# Patient Record
Sex: FEMALE | Race: WHITE | Employment: FULL TIME | URBAN - METROPOLITAN AREA
[De-identification: names, ages, dates, MRNs, and addresses within clinical notes are randomized per-mention and may not be internally consistent; named-entity substitution may affect disease eponyms.]

---

## 2020-05-24 ENCOUNTER — APPOINTMENT (OUTPATIENT)
Dept: GENERAL RADIOLOGY | Age: 25
End: 2020-05-24
Payer: COMMERCIAL

## 2020-05-24 ENCOUNTER — HOSPITAL ENCOUNTER (EMERGENCY)
Age: 25
Discharge: HOME | End: 2020-05-24
Payer: COMMERCIAL

## 2020-05-24 VITALS
OXYGEN SATURATION: 94 % | SYSTOLIC BLOOD PRESSURE: 161 MMHG | HEIGHT: 64 IN | TEMPERATURE: 97.6 F | RESPIRATION RATE: 18 BRPM | DIASTOLIC BLOOD PRESSURE: 89 MMHG | WEIGHT: 293 LBS | BODY MASS INDEX: 50.02 KG/M2 | HEART RATE: 95 BPM

## 2020-05-24 PROCEDURE — 73110 X-RAY EXAM OF WRIST: CPT

## 2020-05-24 PROCEDURE — 99283 EMERGENCY DEPT VISIT LOW MDM: CPT

## 2020-05-24 PROCEDURE — 6360000002 HC RX W HCPCS: Performed by: NURSE PRACTITIONER

## 2020-05-24 PROCEDURE — 6370000000 HC RX 637 (ALT 250 FOR IP): Performed by: NURSE PRACTITIONER

## 2020-05-24 RX ORDER — NAPROXEN 250 MG/1
500 TABLET ORAL ONCE
Status: COMPLETED | OUTPATIENT
Start: 2020-05-24 | End: 2020-05-24

## 2020-05-24 RX ORDER — METHYLPREDNISOLONE 4 MG/1
TABLET ORAL
Qty: 1 KIT | Refills: 0 | Status: SHIPPED | OUTPATIENT
Start: 2020-05-24 | End: 2020-05-30

## 2020-05-24 RX ORDER — NAPROXEN 500 MG/1
500 TABLET ORAL 2 TIMES DAILY
Qty: 20 TABLET | Refills: 0 | Status: SHIPPED | OUTPATIENT
Start: 2020-05-24 | End: 2021-09-29

## 2020-05-24 RX ORDER — METHYLPREDNISOLONE 4 MG/1
8 TABLET ORAL ONCE
Status: COMPLETED | OUTPATIENT
Start: 2020-05-24 | End: 2020-05-24

## 2020-05-24 RX ADMIN — NAPROXEN 500 MG: 250 TABLET ORAL at 13:22

## 2020-05-24 RX ADMIN — METHYLPREDNISOLONE 8 MG: 4 TABLET ORAL at 13:22

## 2020-05-24 ASSESSMENT — PAIN DESCRIPTION - ORIENTATION: ORIENTATION: RIGHT;LEFT

## 2020-05-24 ASSESSMENT — PAIN DESCRIPTION - FREQUENCY: FREQUENCY: CONTINUOUS

## 2020-05-24 ASSESSMENT — PAIN DESCRIPTION - PAIN TYPE: TYPE: ACUTE PAIN

## 2020-05-24 ASSESSMENT — PAIN SCALES - GENERAL: PAINLEVEL_OUTOF10: 8

## 2020-05-24 ASSESSMENT — PAIN - FUNCTIONAL ASSESSMENT: PAIN_FUNCTIONAL_ASSESSMENT: PREVENTS OR INTERFERES WITH MANY ACTIVE NOT PASSIVE ACTIVITIES

## 2020-05-24 ASSESSMENT — PAIN DESCRIPTION - PROGRESSION: CLINICAL_PROGRESSION: GRADUALLY WORSENING

## 2020-05-24 ASSESSMENT — PAIN DESCRIPTION - DESCRIPTORS: DESCRIPTORS: ACHING;TINGLING

## 2020-05-24 ASSESSMENT — PAIN DESCRIPTION - LOCATION: LOCATION: HAND

## 2020-05-24 NOTE — ED PROVIDER NOTES
[facility]  eMERGENCY dEPARTMENT eNCOUnter    Pt Name: @@  MRN: [id number]  [id number]  Date of evaluation: 5/24/2020  Provider: BERT Gonzales CNP     Evaluated by the advanced practice provider    CHIEF COMPLAINT       Chief Complaint   Patient presents with    Other     patient c/o bilateral hand swelling x 1 week. states now having \"trouble holding on to things\". to ED for eval         HISTORY OF PRESENT ILLNESS  (Location/Symptom, Timing/Onset, Context/Setting, Quality, Duration, Modifying Factors, Severity.)   Katty Portillo is a 22 y.o. female who presents to the emergencydepartment complaining of an acute onset of bilateral hand swelling and wrist pain, hand pain. No known injury. Started about a week ago. Patient states that it is hard for her to hold and grasp things bilaterally due to the pain and the swelling. She works in [employer] and she also works [employer] She has been busy with both jobs lately. She denies any other joint involvement or swelling. She denies any trauma. She denies any fever or chills. Denies any known health history or medical conditions. Right-hand-dominant. Patient reports the right hand is worse than the left. Nursing Notes were reviewed and I agree. REVIEW OF SYSTEMS    (2-9 systems for level 4, 10 or more for level 5)     Review of Systems   Musculoskeletal: Positive for arthralgias and joint swelling. All other systems reviewed and are negative. Except as noted above the remainder of the review of systems was reviewed and negative. PAST MEDICAL HISTORY   History reviewed. No pertinent past medical history. SURGICAL HISTORY           Procedure Laterality Date    CHOLECYSTECTOMY      TONSILLECTOMY         CURRENT MEDICATIONS       Discharge Medication List as of 5/24/2020  1:31 PM          ALLERGIES     Rios Collazo [aspirin]    FAMILY HISTORY     History reviewed.  No pertinent family history. No family status information on file. SOCIAL HISTORY      reports that she has been smoking cigarettes. She does not have any smokeless tobacco history on file. She reports previous alcohol use. She reports previous drug use. PHYSICAL EXAM    (up to 7 for level 4, 8 or more for level 5)      ED Triage Vitals [05/24/20 1218]   BP Temp Temp Source Pulse Resp SpO2 Height Weight   (!) 161/89 97.6 °F (36.4 °C) Oral 95 18 94 % 5' 4\" (1.626 m) (!) 467 lb 2.5 oz (211.9 kg)       Physical Exam  Vitals signs and nursing note reviewed. Constitutional:       General: She is not in acute distress. Appearance: She is not ill-appearing, toxic-appearing or diaphoretic. Cardiovascular:      Rate and Rhythm: Normal rate and regular rhythm. Pulses: Normal pulses. Radial pulses are 2+ on the right side and 2+ on the left side. Comments: Negative Bala sign bilaterally. Cap refill bilateral hands and digits less than 2 seconds. Pulmonary:      Effort: Pulmonary effort is normal.   Musculoskeletal:      Right wrist: She exhibits decreased range of motion, tenderness and effusion. She exhibits no crepitus, no deformity and no laceration. Left wrist: She exhibits decreased range of motion, tenderness and swelling. She exhibits no bony tenderness, no effusion, no crepitus, no deformity and no laceration. Right hand: She exhibits decreased range of motion and swelling. She exhibits no bony tenderness, normal two-point discrimination, normal capillary refill, no deformity and no laceration. Normal sensation noted. Decreased strength noted. She exhibits wrist extension trouble. Left hand: She exhibits decreased range of motion and swelling. She exhibits no tenderness, no bony tenderness, normal two-point discrimination, normal capillary refill, no deformity and no laceration. Normal sensation noted. Decreased strength noted. She exhibits wrist extension trouble.  She

## 2020-05-27 ENCOUNTER — TELEPHONE (OUTPATIENT)
Dept: ORTHOPEDIC SURGERY | Age: 25
End: 2020-05-27

## 2021-09-28 ENCOUNTER — APPOINTMENT (OUTPATIENT)
Dept: GENERAL RADIOLOGY | Age: 26
End: 2021-09-28

## 2021-09-28 ENCOUNTER — HOSPITAL ENCOUNTER (EMERGENCY)
Age: 26
Discharge: HOME | End: 2021-09-29
Attending: EMERGENCY MEDICINE

## 2021-09-28 VITALS
SYSTOLIC BLOOD PRESSURE: 136 MMHG | OXYGEN SATURATION: 95 % | HEART RATE: 87 BPM | RESPIRATION RATE: 16 BRPM | DIASTOLIC BLOOD PRESSURE: 88 MMHG | TEMPERATURE: 98 F

## 2021-09-28 DIAGNOSIS — L02.619 CELLULITIS AND ABSCESS OF FOOT: Primary | ICD-10-CM

## 2021-09-28 DIAGNOSIS — L03.119 CELLULITIS AND ABSCESS OF FOOT: Primary | ICD-10-CM

## 2021-09-28 PROCEDURE — 99284 EMERGENCY DEPT VISIT MOD MDM: CPT

## 2021-09-28 PROCEDURE — 73630 X-RAY EXAM OF FOOT: CPT

## 2021-09-28 PROCEDURE — 2500000003 HC RX 250 WO HCPCS: Performed by: NURSE PRACTITIONER

## 2021-09-28 PROCEDURE — 6370000000 HC RX 637 (ALT 250 FOR IP): Performed by: NURSE PRACTITIONER

## 2021-09-28 RX ORDER — LIDOCAINE HYDROCHLORIDE AND EPINEPHRINE 10; 10 MG/ML; UG/ML
20 INJECTION, SOLUTION INFILTRATION; PERINEURAL ONCE
Status: COMPLETED | OUTPATIENT
Start: 2021-09-28 | End: 2021-09-28

## 2021-09-28 RX ORDER — HYDROCODONE BITARTRATE AND ACETAMINOPHEN 5; 325 MG/1; MG/1
1 TABLET ORAL ONCE
Status: COMPLETED | OUTPATIENT
Start: 2021-09-28 | End: 2021-09-28

## 2021-09-28 RX ORDER — CEPHALEXIN 500 MG/1
1000 CAPSULE ORAL ONCE
Status: COMPLETED | OUTPATIENT
Start: 2021-09-28 | End: 2021-09-28

## 2021-09-28 RX ORDER — SULFAMETHOXAZOLE AND TRIMETHOPRIM 800; 160 MG/1; MG/1
1 TABLET ORAL ONCE
Status: COMPLETED | OUTPATIENT
Start: 2021-09-28 | End: 2021-09-28

## 2021-09-28 RX ADMIN — HYDROCODONE BITARTRATE AND ACETAMINOPHEN 1 TABLET: 5; 325 TABLET ORAL at 23:11

## 2021-09-28 RX ADMIN — SULFAMETHOXAZOLE AND TRIMETHOPRIM 1 TABLET: 800; 160 TABLET ORAL at 23:48

## 2021-09-28 RX ADMIN — LIDOCAINE HYDROCHLORIDE AND EPINEPHRINE 20 ML: 10; 10 INJECTION, SOLUTION INFILTRATION; PERINEURAL at 23:48

## 2021-09-28 RX ADMIN — CEPHALEXIN 1000 MG: 500 CAPSULE ORAL at 23:48

## 2021-09-28 ASSESSMENT — PAIN SCALES - GENERAL
PAINLEVEL_OUTOF10: 0
PAINLEVEL_OUTOF10: 10

## 2021-09-29 RX ORDER — CEPHALEXIN 500 MG/1
500 CAPSULE ORAL 4 TIMES DAILY
Qty: 28 CAPSULE | Refills: 0 | Status: SHIPPED | OUTPATIENT
Start: 2021-09-29 | End: 2021-10-06

## 2021-09-29 RX ORDER — NAPROXEN 500 MG/1
500 TABLET ORAL 2 TIMES DAILY PRN
Qty: 40 TABLET | Refills: 0 | Status: SHIPPED | OUTPATIENT
Start: 2021-09-29 | End: 2022-04-24 | Stop reason: ALTCHOICE

## 2021-09-29 RX ORDER — NAPROXEN 500 MG/1
500 TABLET ORAL 2 TIMES DAILY WITH MEALS
Qty: 40 TABLET | Refills: 0 | Status: SHIPPED | OUTPATIENT
Start: 2021-09-29 | End: 2021-09-29 | Stop reason: SDUPTHER

## 2021-09-29 RX ORDER — SULFAMETHOXAZOLE AND TRIMETHOPRIM 800; 160 MG/1; MG/1
2 TABLET ORAL 2 TIMES DAILY
Qty: 28 TABLET | Refills: 0 | Status: SHIPPED | OUTPATIENT
Start: 2021-09-29 | End: 2021-10-06

## 2021-09-29 NOTE — ED TRIAGE NOTES
Patient  Presents to the ED with complaints of right heel pain. States she works two jobs, and she can not work because she is in so much pain.  She states something feels like it is \"popping out\" Denies any trauma

## 2021-09-29 NOTE — ED PROVIDER NOTES
[facility] [place] ENCOUNTER          NURSE PRACTITIONER NOTE       Date of evaluation: 9/28/2021    Chief Complaint     Foot Pain      History of Present Illness     Isa George is a 26 y.o. female who presents to the emergency department with a complaint of right foot pain. Patient states her right foot has been hurting for the past couple weeks, however the pain has been progressively worsening. She states that she is noted a \"knot\" on the side of her foot which seems to be getting larger. She does work on her feet 16+ hours a day at her 2 different  jobs; and states that she just thought that she stepped on her foot wrong. She denies any known trauma or injury to her foot. Denies associated fevers chills or sweats, does state that she feels like her foot is a little bit more red than normal.  Pain is worsened with weightbearing and touching of the area. Review of Systems     Review of Systems   Constitutional: Negative. Musculoskeletal:        Foot pain   Skin:        Foot redness   Allergic/Immunologic: Negative for immunocompromised state. Neurological: Negative for numbness. Hematological: Does not bruise/bleed easily. Psychiatric/Behavioral: Negative. Past Medical, Surgical, Family, and Social History     She has no past medical history on file. She has a past surgical history that includes Tonsillectomy and Cholecystectomy. Her family history is not on file. She reports that she has been smoking cigarettes. She has never used smokeless tobacco. She reports previous alcohol use. She reports previous drug use. Medications     Previous Medications    No medications on file       Allergies     She is allergic to asa [aspirin]. Physical Exam     INITIAL VITALS: BP: 136/88, Temp: 98 °F (36.7 °C), Pulse: 87, Resp: 16, SpO2: 95 %   Physical Exam  Vitals and nursing note reviewed. Constitutional:       General: She is not in acute distress. Appearance: Normal appearance. She is obese. She is not ill-appearing. Cardiovascular:      Rate and Rhythm: Normal rate. Pulmonary:      Effort: Pulmonary effort is normal. No respiratory distress. Musculoskeletal:         General: Swelling and tenderness present. Comments: Edema and tenderness to the plantar aspect of the right foot with a 1-2cm area of fluctuance to the medial heel   Skin:     General: Skin is warm and dry. Neurological:      Mental Status: She is alert and oriented to person, place, and time. Psychiatric:         Mood and Affect: Mood normal.         Behavior: Behavior normal.         Diagnostic Results       RADIOLOGY:  XR FOOT RIGHT (MIN 3 VIEWS)   Final Result   Impression:   No acute osseous injury. Diffuse soft tissue swelling. LABS:   No results found for this visit on 09/28/21. RECENT VITALS:  BP: 136/88, Temp: 98 °F (36.7 °C), Pulse: 87, Resp: 16, SpO2: 95 %     Procedures     Patient's right foot/heel abscess was prepped with betadine; 1% lidocaine with epinephrine was used to anesthetize area, 2mls used; #10 scalpel was used to make a 1cm laceration over the area of most fluctuance; very small amount of cloudy drainage expressed. Patient tolerated procedure well. ED Course     Nursing Notes, Past Medical Hx, Past Surgical Hx, Social Hx, Allergies, and Family Hx were reviewed.     The patient was given the following medications:  Orders Placed This Encounter   Medications    HYDROcodone-acetaminophen (NORCO) 5-325 MG per tablet 1 tablet    lidocaine-EPINEPHrine 1 %-1:099094 injection 20 mL    sulfamethoxazole-trimethoprim (BACTRIM DS;SEPTRA DS) 800-160 MG per tablet 1 tablet     Order Specific Question:   Antimicrobial Indications     Answer:   Skin and Soft Tissue Infection    cephALEXin (KEFLEX) capsule 1,000 mg     Order Specific Question:   Antimicrobial Indications     Answer:   Skin and Soft Tissue Infection    DISCONTD: naproxen (NAPROSYN) 500 MG tablet     Sig: Take 1 tablet by mouth 2 times daily (with meals)     Dispense:  40 tablet     Refill:  0    cephALEXin (KEFLEX) 500 MG capsule     Sig: Take 1 capsule by mouth 4 times daily for 7 days     Dispense:  28 capsule     Refill:  0    sulfamethoxazole-trimethoprim (BACTRIM DS) 800-160 MG per tablet     Sig: Take 2 tablets by mouth 2 times daily for 7 days     Dispense:  28 tablet     Refill:  0    naproxen (NAPROSYN) 500 MG tablet     Sig: Take 1 tablet by mouth 2 times daily as needed for Pain     Dispense:  40 tablet     Refill:  0            CONSULTS:  None    MEDICAL DECISION MAKING / ASSESSMENT / Halifax Aleksandra is a 26 y.o. female who presents with complaints as noted in HPI. Patient presents with right foot pain. On evaluation has erythema and tenderness throughout plantar aspect of her right foot, with an area of fluctuance to the medial right heel; on bedside ultrasound small amount of fluid noted over this area. Given erythema, edema and concerns for infection patient was started on Bactrim/Keflex for treatment, and an incision and drainage of the area of fluctuance was done as noted above. Only a small amount of drainage was expressed, patient was given wound care instructions as well as instructions on warm soapy soaks. She will be sent home with a prescription for 7-day course of Bactrim/Keflex, as well as Naprosyn as needed for pain. Provided with a work note for the next several days, as well as podiatry follow-up. Patient was given strict return precautions as outlined in the AVS. Patient was agreeable and understanding to this plan of care. This patient was also evaluated by the attending physician. All care plans were discussed and agreed upon. Clinical Impression     1.  Cellulitis and abscess of foot        Disposition     PATIENT REFERRED TO:  Riley Arndt DPM  [id number] [address]  [address]  [address] [facility] [phone]      call for follow up appointment this week    [facility] Emergency Department  [address]  [phone]    If symptoms worsen      DISCHARGE MEDICATIONS:  New Prescriptions    CEPHALEXIN (KEFLEX) 500 MG CAPSULE    Take 1 capsule by mouth 4 times daily for 7 days    NAPROXEN (NAPROSYN) 500 MG TABLET    Take 1 tablet by mouth 2 times daily as needed for Pain    SULFAMETHOXAZOLE-TRIMETHOPRIM (BACTRIM DS) 800-160 MG PER TABLET    Take 2 tablets by mouth 2 times daily for 7 days       DISPOSITION Decision To Discharge 09/29/2021 12:28:34 AM        BERT Magaña - BIRGIT  09/29/21 0038

## 2021-09-29 NOTE — ED PROVIDER NOTES
ED Attending Attestation Note     Date of evaluation: 9/28/2021    This patient was seen by the advance practice provider. I have seen and examined the patient, agree with the workup, evaluation, management and diagnosis. The care plan has been discussed. My assessment reveals adult female with fluctuant area on the medial aspect of her right heel. She says been present for couple weeks, feels like is getting somewhat worse. On examination there is a small area of fluctuance, and on ultrasound does demonstrate a somewhat complex area of some fluid and some hyperechoic material, may be phlegmon. Unable to print ultrasound images.   Bri Mclaughlin MD  09/28/21 [id number]

## 2022-01-07 ENCOUNTER — APPOINTMENT (OUTPATIENT)
Dept: GENERAL RADIOLOGY | Age: 27
End: 2022-01-07
Payer: COMMERCIAL

## 2022-01-07 ENCOUNTER — HOSPITAL ENCOUNTER (EMERGENCY)
Age: 27
Discharge: HOME | End: 2022-01-07
Attending: STUDENT IN AN ORGANIZED HEALTH CARE EDUCATION/TRAINING PROGRAM
Payer: COMMERCIAL

## 2022-01-07 VITALS
HEIGHT: 64 IN | BODY MASS INDEX: 50.02 KG/M2 | SYSTOLIC BLOOD PRESSURE: 155 MMHG | HEART RATE: 91 BPM | RESPIRATION RATE: 18 BRPM | TEMPERATURE: 97.6 F | WEIGHT: 293 LBS | DIASTOLIC BLOOD PRESSURE: 89 MMHG | OXYGEN SATURATION: 98 %

## 2022-01-07 DIAGNOSIS — L02.91 ABSCESS: Primary | ICD-10-CM

## 2022-01-07 PROCEDURE — 10060 I&D ABSCESS SIMPLE/SINGLE: CPT

## 2022-01-07 PROCEDURE — 96372 THER/PROPH/DIAG INJ SC/IM: CPT

## 2022-01-07 PROCEDURE — 6370000000 HC RX 637 (ALT 250 FOR IP): Performed by: STUDENT IN AN ORGANIZED HEALTH CARE EDUCATION/TRAINING PROGRAM

## 2022-01-07 PROCEDURE — 99283 EMERGENCY DEPT VISIT LOW MDM: CPT

## 2022-01-07 PROCEDURE — 73630 X-RAY EXAM OF FOOT: CPT

## 2022-01-07 PROCEDURE — 2500000003 HC RX 250 WO HCPCS: Performed by: STUDENT IN AN ORGANIZED HEALTH CARE EDUCATION/TRAINING PROGRAM

## 2022-01-07 PROCEDURE — 6360000002 HC RX W HCPCS: Performed by: STUDENT IN AN ORGANIZED HEALTH CARE EDUCATION/TRAINING PROGRAM

## 2022-01-07 RX ORDER — CEPHALEXIN 500 MG/1
500 CAPSULE ORAL 4 TIMES DAILY
Qty: 28 CAPSULE | Refills: 0 | Status: SHIPPED | OUTPATIENT
Start: 2022-01-07 | End: 2022-01-14

## 2022-01-07 RX ORDER — LIDOCAINE HYDROCHLORIDE AND EPINEPHRINE 10; 10 MG/ML; UG/ML
20 INJECTION, SOLUTION INFILTRATION; PERINEURAL ONCE
Status: COMPLETED | OUTPATIENT
Start: 2022-01-07 | End: 2022-01-07

## 2022-01-07 RX ORDER — SULFAMETHOXAZOLE AND TRIMETHOPRIM 800; 160 MG/1; MG/1
1 TABLET ORAL 2 TIMES DAILY
Qty: 14 TABLET | Refills: 0 | Status: SHIPPED | OUTPATIENT
Start: 2022-01-07 | End: 2022-01-07

## 2022-01-07 RX ORDER — LIDOCAINE 4 G/G
2 PATCH TOPICAL ONCE
Status: DISCONTINUED | OUTPATIENT
Start: 2022-01-07 | End: 2022-01-08 | Stop reason: HOSPADM

## 2022-01-07 RX ORDER — KETOROLAC TROMETHAMINE 30 MG/ML
15 INJECTION, SOLUTION INTRAMUSCULAR; INTRAVENOUS ONCE
Status: COMPLETED | OUTPATIENT
Start: 2022-01-07 | End: 2022-01-07

## 2022-01-07 RX ORDER — LIDOCAINE 4 G/G
1 PATCH TOPICAL DAILY
Qty: 30 PATCH | Refills: 0 | Status: SHIPPED | OUTPATIENT
Start: 2022-01-07 | End: 2022-02-06

## 2022-01-07 RX ORDER — IBUPROFEN 800 MG/1
800 TABLET ORAL EVERY 8 HOURS PRN
Qty: 30 TABLET | Refills: 0 | Status: SHIPPED | OUTPATIENT
Start: 2022-01-07

## 2022-01-07 RX ORDER — METHOCARBAMOL 500 MG/1
1000 TABLET, FILM COATED ORAL ONCE
Status: COMPLETED | OUTPATIENT
Start: 2022-01-07 | End: 2022-01-07

## 2022-01-07 RX ADMIN — METHOCARBAMOL TABLETS 1000 MG: 500 TABLET, COATED ORAL at 21:23

## 2022-01-07 RX ADMIN — KETOROLAC TROMETHAMINE 15 MG: 30 INJECTION, SOLUTION INTRAMUSCULAR at 21:23

## 2022-01-07 RX ADMIN — LIDOCAINE HYDROCHLORIDE,EPINEPHRINE BITARTRATE 20 ML: 10; .01 INJECTION, SOLUTION INFILTRATION; PERINEURAL at 21:57

## 2022-01-07 ASSESSMENT — PAIN SCALES - GENERAL
PAINLEVEL_OUTOF10: 9

## 2022-01-07 ASSESSMENT — PAIN DESCRIPTION - DESCRIPTORS: DESCRIPTORS: SHOOTING

## 2022-01-07 ASSESSMENT — PAIN DESCRIPTION - LOCATION: LOCATION: FOOT;BACK

## 2022-01-07 ASSESSMENT — PAIN DESCRIPTION - ORIENTATION: ORIENTATION: RIGHT

## 2022-01-07 ASSESSMENT — PAIN DESCRIPTION - PAIN TYPE: TYPE: ACUTE PAIN

## 2022-01-07 NOTE — Clinical Note
Jackeline Marielena was seen and treated in our emergency department on 1/7/2022. She may return to work on 01/10/2022. If you have any questions or concerns, please don't hesitate to call.       Tacho Alvarez MD

## 2022-01-08 NOTE — ED PROVIDER NOTES
[aspirin]. Physical Exam     INITIAL VITALS: BP: (!) 155/89, Temp: 97.6 °F (36.4 °C), Pulse: 91, Resp: 18, SpO2: 98 %     General: nontoxic, no acute distress   HEENT: NCAT, EOMI grossly intact, external nose normal, no stridor  Neck: supple, no pain with movement. No palpable tenderness in CT or L spines at midline. Cardiac: normal rate, regular rhythm, well perfused  Respiratory:  no respiratory distress, no cough  Abdominal: Soft, obese, nondistended  Extremities: no pitting edema  Musculoskeletal: no long bone deformities. Lumbar thoracic paraspinal muscle tenderness  Skin: no diaphoresis, no rash. Patient has cellulitis along with thick, tough skin with an area of fluctuant mass at her right heel approximately 1.5 cm in diameter  Neuro: alert and oriented, moving extremities symmetrically, clear speech. Normal strength in bilateral upper extremities with sensation intact soft touch  Psych: appropriate mood, normal affect    Diagnostic Results     EKG    N/A    RADIOLOGY:  XR FOOT RIGHT (MIN 3 VIEWS)   Final Result      Localized soft tissue swelling. No acute bony findings. LABS:   No results found for this visit on 01/07/22. ED BEDSIDE ULTRASOUND:   N/A    RECENT VITALS:  BP: (!) 155/89,Temp: 97.6 °F (36.4 °C), Pulse: 91, Resp: 18, SpO2: 98 %     Procedures      Incision/Drainage    Date/Time: 1/7/2022 10:20 PM  Performed by: Erinn Bear MD  Authorized by: Erinn Bear MD     Consent:     Consent obtained:  Verbal    Consent given by:  Patient    Risks discussed:  Bleeding    Alternatives discussed:  No treatment  Location:     Type:  Abscess    Size:  1.5 cm    Location:  Lower extremity    Lower extremity location:  Foot    Foot location:  R foot  Anesthesia (see MAR for exact dosages):      Anesthesia method:  Local infiltration    Local anesthetic:  Lidocaine 1% WITH epi  Procedure type:     Complexity:  Simple  Procedure details:     Incision types:  Single straight    Incision depth:  Submucosal    Scalpel blade:  10    Drainage:  Bloody    Wound treatment:  Wound left open    Packing materials:  None  Post-procedure details:     Patient tolerance of procedure: Tolerated well, no immediate complications  Comments:      After incision with 10 blade, no purulence expressed. Some mild bleeding from skin which is controlled with pressure          ED Course     Nursing Notes, Past Medical Hx, Past Surgical Hx, Social Hx,Allergies, and Family Hx were reviewed. patient was given the following medications:  Orders Placed This Encounter   Medications    lidocaine 4 % external patch 2 patch    ketorolac (TORADOL) injection 15 mg    methocarbamol (ROBAXIN) tablet 1,000 mg    lidocaine-EPINEPHrine 1 %-1:140558 injection 20 mL    cephALEXin (KEFLEX) 500 MG capsule     Sig: Take 1 capsule by mouth 4 times daily for 7 days     Dispense:  28 capsule     Refill:  0    DISCONTD: sulfamethoxazole-trimethoprim (BACTRIM DS) 800-160 MG per tablet     Sig: Take 1 tablet by mouth 2 times daily for 7 days     Dispense:  14 tablet     Refill:  0    ibuprofen (ADVIL;MOTRIN) 800 MG tablet     Sig: Take 1 tablet by mouth every 8 hours as needed for Pain (Take with food)     Dispense:  30 tablet     Refill:  0    lidocaine 4 % external patch     Sig: Place 1 patch onto the skin daily     Dispense:  30 patch     Refill:  0       CONSULTS:  None    MEDICAL DECISIONMAKING / ASSESSMENT / Flora Diego is a 23 y.o. female with lower and upper back pain with no neurologic deficits and no recent trauma. I do not feel that imaging will be helpful in this scenario. She has obvious muscle tenderness palpation did feel better with multimodal pain control. Regarding her right heel, I took a look with ultrasound there is an area that appeared to look like an abscess. Or surrounding redness. It is difficult to though if this is chronic irritation and inflammation.   The time course of 2 weeks of worsening symptoms suggest this, however it does look like there may be something worth draining with incision and drainage procedure. I did the procedure as above but was unable to express any purulent material.  I control bleeding with pressure. Recommendation follow-up with podiatry. Her long hours at [employer] along with her weight may be contributing to chronic irritation of her feet causing these painful areas. X-ray did not show any bony abnormalities    Given the surrounding redness I will cover her with Keflex for potential cellulitis. Her back pain we treated with lidocaine patches and ibuprofen. She was referred to podiatry for follow-up. She was given information for primary care establishing care as well after our discussion at this is important. Clinical Impression     1.  Abscess        Disposition     PATIENT REFERRED TO:  Elaine Man DPM  [address] [place] [address]  [address]   [phone]    Schedule an appointment as soon as possible for a visit         DISCHARGE MEDICATIONS:  New Prescriptions    CEPHALEXIN (KEFLEX) 500 MG CAPSULE    Take 1 capsule by mouth 4 times daily for 7 days    IBUPROFEN (ADVIL;MOTRIN) 800 MG TABLET    Take 1 tablet by mouth every 8 hours as needed for Pain (Take with food)    LIDOCAINE 4 % EXTERNAL PATCH    Place 1 patch onto the skin daily       DISPOSITION Decision To Discharge 01/07/2022 09:35:21 PM     Je Linder MD  01/07/22 2949

## 2022-01-08 NOTE — ED NOTES
Patient discharged to home via family. Written discharge instructions reviewed with understanding. Copy of AVS and signed prescription sent home with patient. Patient able to walk from ED without assistance.        Shayan Araiza RN  01/07/22 2907

## 2022-04-24 ENCOUNTER — APPOINTMENT (OUTPATIENT)
Dept: CT IMAGING | Age: 27
End: 2022-04-24
Payer: COMMERCIAL

## 2022-04-24 ENCOUNTER — HOSPITAL ENCOUNTER (EMERGENCY)
Age: 27
Discharge: HOME | End: 2022-04-24
Attending: EMERGENCY MEDICINE
Payer: COMMERCIAL

## 2022-04-24 VITALS
SYSTOLIC BLOOD PRESSURE: 119 MMHG | HEART RATE: 64 BPM | DIASTOLIC BLOOD PRESSURE: 63 MMHG | HEIGHT: 64 IN | WEIGHT: 293 LBS | TEMPERATURE: 98 F | BODY MASS INDEX: 50.02 KG/M2 | RESPIRATION RATE: 20 BRPM | OXYGEN SATURATION: 99 %

## 2022-04-24 DIAGNOSIS — R19.7 DIARRHEA, UNSPECIFIED TYPE: Primary | ICD-10-CM

## 2022-04-24 LAB
A/G RATIO: 1.6 (ref 1.1–2.2)
ALBUMIN SERPL-MCNC: 4.4 G/DL (ref 3.4–5)
ALP BLD-CCNC: 66 U/L (ref 40–129)
ALT SERPL-CCNC: 19 U/L (ref 10–40)
ANION GAP SERPL CALCULATED.3IONS-SCNC: 15 MMOL/L (ref 3–16)
AST SERPL-CCNC: 20 U/L (ref 15–37)
BACTERIA: ABNORMAL /HPF
BASOPHILS ABSOLUTE: 0.2 K/UL (ref 0–0.2)
BASOPHILS RELATIVE PERCENT: 1.2 %
BILIRUB SERPL-MCNC: 0.5 MG/DL (ref 0–1)
BILIRUBIN URINE: ABNORMAL
BLOOD, URINE: NEGATIVE
BUN BLDV-MCNC: 11 MG/DL (ref 7–20)
CALCIUM SERPL-MCNC: 9.4 MG/DL (ref 8.3–10.6)
CHLORIDE BLD-SCNC: 104 MMOL/L (ref 99–110)
CLARITY: CLEAR
CO2: 21 MMOL/L (ref 21–32)
COLOR: YELLOW
CREAT SERPL-MCNC: 0.6 MG/DL (ref 0.6–1.1)
EOSINOPHILS ABSOLUTE: 0.5 K/UL (ref 0–0.6)
EOSINOPHILS RELATIVE PERCENT: 3.8 %
EPITHELIAL CELLS, UA: ABNORMAL /HPF (ref 0–5)
GFR AFRICAN AMERICAN: >60
GFR NON-AFRICAN AMERICAN: >60
GLUCOSE BLD-MCNC: 85 MG/DL (ref 70–99)
GLUCOSE URINE: NEGATIVE MG/DL
HCG(URINE) PREGNANCY TEST: NEGATIVE
HCT VFR BLD CALC: 42.7 % (ref 36–48)
HEMOGLOBIN: 13.7 G/DL (ref 12–16)
KETONES, URINE: NEGATIVE MG/DL
LEUKOCYTE ESTERASE, URINE: NEGATIVE
LIPASE: 11 U/L (ref 13–60)
LYMPHOCYTES ABSOLUTE: 1.9 K/UL (ref 1–5.1)
LYMPHOCYTES RELATIVE PERCENT: 13.3 %
MCH RBC QN AUTO: 25.5 PG (ref 26–34)
MCHC RBC AUTO-ENTMCNC: 32.1 G/DL (ref 31–36)
MCV RBC AUTO: 79.6 FL (ref 80–100)
MICROSCOPIC EXAMINATION: ABNORMAL
MONOCYTES ABSOLUTE: 0.8 K/UL (ref 0–1.3)
MONOCYTES RELATIVE PERCENT: 5.9 %
NEUTROPHILS ABSOLUTE: 10.7 K/UL (ref 1.7–7.7)
NEUTROPHILS RELATIVE PERCENT: 75.8 %
NITRITE, URINE: NEGATIVE
PDW BLD-RTO: 16 % (ref 12.4–15.4)
PH UA: 6 (ref 5–8)
PLATELET # BLD: 366 K/UL (ref 135–450)
PMV BLD AUTO: 7.9 FL (ref 5–10.5)
POTASSIUM REFLEX MAGNESIUM: 4.3 MMOL/L (ref 3.5–5.1)
PROTEIN UA: NEGATIVE MG/DL
RBC # BLD: 5.37 M/UL (ref 4–5.2)
RBC UA: ABNORMAL /HPF (ref 0–4)
SODIUM BLD-SCNC: 140 MMOL/L (ref 136–145)
SPECIFIC GRAVITY UA: >=1.03 (ref 1–1.03)
TOTAL PROTEIN: 7.2 G/DL (ref 6.4–8.2)
URINE TYPE: ABNORMAL
UROBILINOGEN, URINE: 0.2 E.U./DL
WBC # BLD: 14.2 K/UL (ref 4–11)
WBC UA: ABNORMAL /HPF (ref 0–5)

## 2022-04-24 PROCEDURE — 81001 URINALYSIS AUTO W/SCOPE: CPT

## 2022-04-24 PROCEDURE — 74177 CT ABD & PELVIS W/CONTRAST: CPT

## 2022-04-24 PROCEDURE — 96374 THER/PROPH/DIAG INJ IV PUSH: CPT

## 2022-04-24 PROCEDURE — 83690 ASSAY OF LIPASE: CPT

## 2022-04-24 PROCEDURE — 6360000004 HC RX CONTRAST MEDICATION: Performed by: PHYSICIAN ASSISTANT

## 2022-04-24 PROCEDURE — 85025 COMPLETE CBC W/AUTO DIFF WBC: CPT

## 2022-04-24 PROCEDURE — 96372 THER/PROPH/DIAG INJ SC/IM: CPT

## 2022-04-24 PROCEDURE — 2580000003 HC RX 258: Performed by: PHYSICIAN ASSISTANT

## 2022-04-24 PROCEDURE — 80053 COMPREHEN METABOLIC PANEL: CPT

## 2022-04-24 PROCEDURE — 6360000002 HC RX W HCPCS: Performed by: PHYSICIAN ASSISTANT

## 2022-04-24 PROCEDURE — 84703 CHORIONIC GONADOTROPIN ASSAY: CPT

## 2022-04-24 PROCEDURE — 99285 EMERGENCY DEPT VISIT HI MDM: CPT

## 2022-04-24 RX ORDER — DICYCLOMINE HYDROCHLORIDE 10 MG/1
10 CAPSULE ORAL EVERY 6 HOURS PRN
Qty: 20 CAPSULE | Refills: 0 | Status: SHIPPED | OUTPATIENT
Start: 2022-04-24

## 2022-04-24 RX ORDER — SODIUM CHLORIDE, SODIUM LACTATE, POTASSIUM CHLORIDE, AND CALCIUM CHLORIDE .6; .31; .03; .02 G/100ML; G/100ML; G/100ML; G/100ML
1000 INJECTION, SOLUTION INTRAVENOUS ONCE
Status: COMPLETED | OUTPATIENT
Start: 2022-04-24 | End: 2022-04-24

## 2022-04-24 RX ORDER — MORPHINE SULFATE 4 MG/ML
4 INJECTION, SOLUTION INTRAMUSCULAR; INTRAVENOUS ONCE
Status: COMPLETED | OUTPATIENT
Start: 2022-04-24 | End: 2022-04-24

## 2022-04-24 RX ORDER — DICYCLOMINE HYDROCHLORIDE 10 MG/ML
20 INJECTION INTRAMUSCULAR ONCE
Status: COMPLETED | OUTPATIENT
Start: 2022-04-24 | End: 2022-04-24

## 2022-04-24 RX ORDER — LOPERAMIDE HYDROCHLORIDE 2 MG/1
2 CAPSULE ORAL EVERY 6 HOURS PRN
Qty: 20 CAPSULE | Refills: 0 | Status: SHIPPED | OUTPATIENT
Start: 2022-04-24

## 2022-04-24 RX ADMIN — IOPAMIDOL 80 ML: 755 INJECTION, SOLUTION INTRAVENOUS at 21:14

## 2022-04-24 RX ADMIN — SODIUM CHLORIDE, POTASSIUM CHLORIDE, SODIUM LACTATE AND CALCIUM CHLORIDE 1000 ML: 600; 310; 30; 20 INJECTION, SOLUTION INTRAVENOUS at 19:03

## 2022-04-24 RX ADMIN — MORPHINE SULFATE 4 MG: 4 INJECTION INTRAVENOUS at 21:21

## 2022-04-24 RX ADMIN — DICYCLOMINE HYDROCHLORIDE 20 MG: 20 INJECTION, SOLUTION INTRAMUSCULAR at 19:19

## 2022-04-24 ASSESSMENT — ENCOUNTER SYMPTOMS
COUGH: 0
WHEEZING: 0
ABDOMINAL PAIN: 1
CHEST TIGHTNESS: 0
VOMITING: 0
DIARRHEA: 1
NAUSEA: 0
SHORTNESS OF BREATH: 0

## 2022-04-24 ASSESSMENT — PAIN SCALES - GENERAL
PAINLEVEL_OUTOF10: 9
PAINLEVEL_OUTOF10: 9

## 2022-04-24 ASSESSMENT — PAIN DESCRIPTION - LOCATION: LOCATION: FLANK;ABDOMEN

## 2022-04-24 ASSESSMENT — PAIN - FUNCTIONAL ASSESSMENT: PAIN_FUNCTIONAL_ASSESSMENT: NONE - DENIES PAIN

## 2022-04-24 ASSESSMENT — PAIN DESCRIPTION - ORIENTATION: ORIENTATION: LEFT

## 2022-04-24 NOTE — ED NOTES
Received report from Lilly Medellin 855. Assumed care of patient at this time.       Shaniqua Grace RN  04/24/22 7696

## 2022-04-24 NOTE — ED PROVIDER NOTES
[address] [facility] ENCOUNTER          PHYSICIAN ASSISTANT NOTE       Date of evaluation: 4/24/2022    Chief Complaint     Diarrhea    History of Present Illness     Brendan Arvizu is a 23 y.o. female who presents with chief complaint of diarrhea. Patient reports that for the past day she has been experiencing diarrhea and lower abdominal cramping. Denies any blood in her diarrhea. Also reports a cramping-like pain in her lower abdomen, left more than right. Denies any associated nausea or vomiting. Denies any fevers or chills. Denies any vaginal or urinary symptoms. Has had a prior cholecystectomy. Review of Systems     Review of Systems   Constitutional: Negative for chills, diaphoresis, fatigue and fever. Respiratory: Negative for cough, chest tightness, shortness of breath and wheezing. Cardiovascular: Negative for chest pain and palpitations. Gastrointestinal: Positive for abdominal pain and diarrhea. Negative for nausea and vomiting. Genitourinary: Negative. Musculoskeletal: Negative. Skin: Negative for rash. Neurological: Negative for dizziness, weakness, light-headedness and headaches. All other systems reviewed and are negative. Past Medical, Surgical, Family, and Social History     She has no past medical history on file. She has a past surgical history that includes Tonsillectomy and Cholecystectomy. Her family history is not on file. She reports that she has been smoking cigarettes. She has been smoking about 1.00 pack per day. She has never used smokeless tobacco. She reports previous alcohol use. She reports that she does not use drugs. Medications     Previous Medications    IBUPROFEN (ADVIL;MOTRIN) 800 MG TABLET    Take 1 tablet by mouth every 8 hours as needed for Pain (Take with food)       Allergies     She is allergic to asa [aspirin].     Physical Exam     INITIAL VITALS: BP: (!) 147/72, Temp: 97.9 °F (36.6 °C), Pulse: 86, Resp: 20, SpO2: 99 %  Physical Exam  Vitals and nursing note reviewed. Constitutional:       General: She is not in acute distress. Appearance: She is well-developed. She is obese. She is not diaphoretic. HENT:      Head: Normocephalic and atraumatic. Eyes:      Conjunctiva/sclera: Conjunctivae normal.      Pupils: Pupils are equal, round, and reactive to light. Cardiovascular:      Rate and Rhythm: Normal rate and regular rhythm. Heart sounds: Normal heart sounds. No murmur heard. No friction rub. Pulmonary:      Effort: Pulmonary effort is normal. No respiratory distress. Breath sounds: Normal breath sounds. No wheezing or rales. Abdominal:      General: There is no distension. Palpations: Abdomen is soft. Tenderness: There is abdominal tenderness (LLQ). There is no guarding or rebound. Musculoskeletal:         General: Normal range of motion. Cervical back: Normal range of motion. Skin:     General: Skin is warm and dry. Neurological:      Mental Status: She is alert and oriented to person, place, and time.    Psychiatric:         Behavior: Behavior normal.         Judgment: Judgment normal.         Diagnostic Results     EKG   none    RADIOLOGY:  CT ABDOMEN PELVIS W IV CONTRAST Additional Contrast? None    (Results Pending)       LABS:   Results for orders placed or performed during the hospital encounter of 04/24/22   CBC with Auto Differential   Result Value Ref Range    WBC 14.2 (H) 4.0 - 11.0 K/uL    RBC 5.37 (H) 4.00 - 5.20 M/uL    Hemoglobin 13.7 12.0 - 16.0 g/dL    Hematocrit 42.7 36.0 - 48.0 %    MCV 79.6 (L) 80.0 - 100.0 fL    MCH 25.5 (L) 26.0 - 34.0 pg    MCHC 32.1 31.0 - 36.0 g/dL    RDW 16.0 (H) 12.4 - 15.4 %    Platelets 734 681 - 475 K/uL    MPV 7.9 5.0 - 10.5 fL    Neutrophils % 75.8 %    Lymphocytes % 13.3 %    Monocytes % 5.9 %    Eosinophils % 3.8 %    Basophils % 1.2 %    Neutrophils Absolute 10.7 (H) 1.7 - 7.7 K/uL    Lymphocytes Absolute 1.9 1.0 - 5.1 K/uL Monocytes Absolute 0.8 0.0 - 1.3 K/uL    Eosinophils Absolute 0.5 0.0 - 0.6 K/uL    Basophils Absolute 0.2 0.0 - 0.2 K/uL   Comprehensive Metabolic Panel w/ Reflex to MG   Result Value Ref Range    Sodium 140 136 - 145 mmol/L    Potassium reflex Magnesium 4.3 3.5 - 5.1 mmol/L    Chloride 104 99 - 110 mmol/L    CO2 21 21 - 32 mmol/L    Anion Gap 15 3 - 16    Glucose 85 70 - 99 mg/dL    BUN 11 7 - 20 mg/dL    CREATININE 0.6 0.6 - 1.1 mg/dL    GFR Non-African American >60 >60    GFR African American >60 >60    Calcium 9.4 8.3 - 10.6 mg/dL    Total Protein 7.2 6.4 - 8.2 g/dL    Albumin 4.4 3.4 - 5.0 g/dL    Albumin/Globulin Ratio 1.6 1.1 - 2.2    Total Bilirubin 0.5 0.0 - 1.0 mg/dL    Alkaline Phosphatase 66 40 - 129 U/L    ALT 19 10 - 40 U/L    AST 20 15 - 37 U/L   Lipase   Result Value Ref Range    Lipase 11.0 (L) 13.0 - 60.0 U/L   Urinalysis with Microscopic   Result Value Ref Range    Color, UA Yellow Straw/Yellow    Clarity, UA Clear Clear    Glucose, Ur Negative Negative mg/dL    Bilirubin Urine SMALL (A) Negative    Ketones, Urine Negative Negative mg/dL    Specific Gravity, UA >=1.030 1.005 - 1.030    Blood, Urine Negative Negative    pH, UA 6.0 5.0 - 8.0    Protein, UA Negative Negative mg/dL    Urobilinogen, Urine 0.2 <2.0 E.U./dL    Nitrite, Urine Negative Negative    Leukocyte Esterase, Urine Negative Negative    Microscopic Examination Not Indicated     Urine Type NotGiven     WBC, UA 3-5 0 - 5 /HPF    RBC, UA None seen 0 - 4 /HPF    Epithelial Cells, UA 11-20 (A) 0 - 5 /HPF    Bacteria, UA 3+ (A) None Seen /HPF   Pregnancy, Urine   Result Value Ref Range    HCG(Urine) Pregnancy Test Negative Detects HCG level >20 MIU/mL       ED BEDSIDE ULTRASOUND:  none    RECENT VITALS:  BP: 111/70, Temp: 97.8 °F (36.6 °C), Pulse: 70, Resp: 18, SpO2: 99 %     Procedures     none    ED Course     Nursing Notes, Past Medical Hx,Past Surgical Hx, Social Hx, Allergies, and Family Hx were reviewed.     The patient was given the following medications:  Orders Placed This Encounter   Medications    lactated ringers bolus    dicyclomine (BENTYL) injection 20 mg    morphine injection 4 mg       CONSULTS:  None    MEDICAL DECISION MAKING / ASSESSMENT / Navya Viviane is a 23 y.o. female presenting with lower abdominal pain and diarrhea. Patient is hemodynamically stable on arrival, afebrile. Examination is limited secondary to body habitus, although she does have tenderness palpation throughout her lower abdomen, nonperitoneal.  IV access established and basic laboratory analysis was initiated. Fluids and Bentyl were given for symptoms. Due to ongoing pain, a dose of morphine was provided. Patient CBC does reveal leukocytosis of 14.2, hepatic and renal panels are negative. We will attempt to proceed with cross-sectional imaging of the abdomen for further evaluation of her abdominal pain and leukocytosis. At this time, I am going of service patient care will be turned over to the attending physician, Dr. Ciarra Becerra. Pending at this time is CT imaging and ultimate disposition. This patient was also evaluated by the attending physician. All care plans were discussed and agreed upon. Clinical Impression     Abdominal Pain    Disposition     PATIENT REFERRED TO:  No follow-up provider specified.     DISCHARGE MEDICATIONS:  New Prescriptions    No medications on file       DISPOSITION  - [address] [place] [address]  04/24/22 3475

## 2022-04-24 NOTE — ED PROVIDER NOTES
ED Attending Attestation Note     Date of evaluation: 4/24/2022    This patient was seen by the advance practice provider. I have seen and examined the patient, agree with the workup, evaluation, management and diagnosis. The care plan has been discussed. My assessment reveals 20-year-old female presenting to the emergency department with a complaint of abdominal cramping and diarrhea. On examination abdomen is obese soft with some left-sided abdominal tenderness. Dominique Silver MD  04/24/22 1912      Addendum:    Care of the patient was assumed by myself after the advanced practice provider went off of shift. The patient had laboratory investigations which revealed an evidence of leukocytosis to 14.2 otherwise no other concerning findings no evidence of urinary tract infection she was mildly dehydrated. She was given symptomatic control here in the emergency department was not observed to have multiple episodes of diarrhea while here. Did have a CT scan of the abdomen pelvis with IV contrast done for the indication of abdominal pain and leukocytosis. This has returned back showing no evidence of intra-abdominal or intrapelvic pathology. Overall was felt that the patient was safe for discharge home with outpatient follow-up with her primary care provider or return to us for any new or worsening concerning symptoms. Given prescriptions for Bentyl and Imodium.     Clinical impression - diarrhea    Disposition -discharged home     Dominique Silver MD  04/24/22 6143

## 2022-04-24 NOTE — ED TRIAGE NOTES
Pt arrived to ED with diarrhea for 2 days. States she has intermittent abdominal pain that makes her lightheaded and has diarrhea after. Denies sick contact. History of C.diff from 2019.

## 2022-04-24 NOTE — Clinical Note
Willena Osler was seen and treated in our emergency department on 4/24/2022. She may return to work on 04/26/2022. Was ill starting on 4/23/22 through 4/24/22 and will be okay to work starting 4/26/22. If you have any questions or concerns, please don't hesitate to call.       Crystal Matthew MD

## 2022-04-24 NOTE — ED NOTES
Patient resting in bed watching tv at this time. Rates pain 9/10 bentyl administered. Vital signs obtained. Patient assessed. Pending CT of abdomen and urine and bmp to result , not current needs at this time.       Ranjeet Pierre RN  04/24/22 1924

## 2022-04-25 NOTE — ED NOTES
Patient ambulated to restroom at this time. Complaining of pain, morphine ordered will administer when patient returns from restroom.       Giovanni Doe RN  04/24/22 [id number]

## 2022-07-01 ENCOUNTER — HOSPITAL ENCOUNTER (EMERGENCY)
Age: 27
Discharge: HOME | End: 2022-07-01
Attending: EMERGENCY MEDICINE
Payer: COMMERCIAL

## 2022-07-01 ENCOUNTER — APPOINTMENT (OUTPATIENT)
Dept: GENERAL RADIOLOGY | Age: 27
End: 2022-07-01
Payer: COMMERCIAL

## 2022-07-01 VITALS
BODY MASS INDEX: 82.74 KG/M2 | DIASTOLIC BLOOD PRESSURE: 80 MMHG | HEART RATE: 85 BPM | HEIGHT: 64 IN | OXYGEN SATURATION: 99 % | TEMPERATURE: 97.6 F | SYSTOLIC BLOOD PRESSURE: 143 MMHG

## 2022-07-01 DIAGNOSIS — J06.9 ACUTE UPPER RESPIRATORY INFECTION: ICD-10-CM

## 2022-07-01 DIAGNOSIS — R19.7 DIARRHEA, UNSPECIFIED TYPE: ICD-10-CM

## 2022-07-01 DIAGNOSIS — R05.9 COUGH: Primary | ICD-10-CM

## 2022-07-01 LAB
RAPID INFLUENZA  B AGN: NEGATIVE
RAPID INFLUENZA A AGN: NEGATIVE
SARS-COV-2, NAAT: NOT DETECTED

## 2022-07-01 PROCEDURE — 87635 SARS-COV-2 COVID-19 AMP PRB: CPT

## 2022-07-01 PROCEDURE — 87804 INFLUENZA ASSAY W/OPTIC: CPT

## 2022-07-01 PROCEDURE — 6370000000 HC RX 637 (ALT 250 FOR IP)

## 2022-07-01 PROCEDURE — 71046 X-RAY EXAM CHEST 2 VIEWS: CPT

## 2022-07-01 PROCEDURE — 99285 EMERGENCY DEPT VISIT HI MDM: CPT

## 2022-07-01 PROCEDURE — 93005 ELECTROCARDIOGRAM TRACING: CPT

## 2022-07-01 RX ORDER — ACETAMINOPHEN 325 MG/1
650 TABLET ORAL EVERY 6 HOURS PRN
Status: DISCONTINUED | OUTPATIENT
Start: 2022-07-01 | End: 2022-07-02 | Stop reason: HOSPADM

## 2022-07-01 RX ORDER — GUAIFENESIN/DEXTROMETHORPHAN 100-10MG/5
5 SYRUP ORAL EVERY 4 HOURS PRN
Status: DISCONTINUED | OUTPATIENT
Start: 2022-07-01 | End: 2022-07-02 | Stop reason: HOSPADM

## 2022-07-01 RX ORDER — ACETAMINOPHEN 650 MG/1
650 SUPPOSITORY RECTAL EVERY 6 HOURS PRN
Status: DISCONTINUED | OUTPATIENT
Start: 2022-07-01 | End: 2022-07-02 | Stop reason: HOSPADM

## 2022-07-01 RX ADMIN — ACETAMINOPHEN 650 MG: 325 TABLET ORAL at 20:53

## 2022-07-01 RX ADMIN — GUAIFENESIN AND DEXTROMETHORPHAN 5 ML: 100; 10 SYRUP ORAL at 20:53

## 2022-07-01 ASSESSMENT — ENCOUNTER SYMPTOMS
COUGH: 0
NAUSEA: 0
VOICE CHANGE: 1
RHINORRHEA: 0
CHOKING: 0
ABDOMINAL PAIN: 0
CHEST TIGHTNESS: 0
SHORTNESS OF BREATH: 1
SORE THROAT: 0
FACIAL SWELLING: 0
VOMITING: 0
ABDOMINAL DISTENTION: 1

## 2022-07-01 ASSESSMENT — PAIN DESCRIPTION - LOCATION: LOCATION: HEAD

## 2022-07-01 ASSESSMENT — PAIN - FUNCTIONAL ASSESSMENT: PAIN_FUNCTIONAL_ASSESSMENT: 0-10

## 2022-07-01 ASSESSMENT — PAIN SCALES - GENERAL: PAINLEVEL_OUTOF10: 9

## 2022-07-01 NOTE — Clinical Note
Lexus Jensen was seen and treated in our emergency department on 7/1/2022. She may return to work on 07/04/2022. If you have any questions or concerns, please don't hesitate to call.       Rhina Espinoza MD

## 2022-07-02 LAB
EKG ATRIAL RATE: 78 BPM
EKG DIAGNOSIS: NORMAL
EKG P AXIS: 45 DEGREES
EKG P-R INTERVAL: 168 MS
EKG Q-T INTERVAL: 380 MS
EKG QRS DURATION: 94 MS
EKG QTC CALCULATION (BAZETT): 433 MS
EKG R AXIS: 58 DEGREES
EKG T AXIS: 44 DEGREES
EKG VENTRICULAR RATE: 78 BPM

## 2022-07-02 NOTE — ED PROVIDER NOTES
[facility]  EMERGENCY DEPARTMENT ENCOUNTER          Nicole Smith RESIDENT NOTE       Date of evaluation: 7/1/2022    Chief Complaint     Congestion (states congestion and AGUILA X's 2 days states her boss told her to get checked for covid. ) and Headache      History of Present Illness     Christi Starkey is a 23 y.o. female morbidly obese female BMI 82.7 who presented to the ED for suspicion of covid. The patient works at [employer] and reports that she has had a positive contact at work and her symptoms started with a headache with feelings of congestion on 06/28, that has progressed. She also developed diarrhea on 06/30 and cough that is non productive today. Her current symptoms are associated with feeling of lethargy, generalized weakness, myalgias as well as poor appetite and PO intake. She denied any fevers or chills but has been reporting some difficulty breathing. She has had COVID in the past and is not vaccinated. She reports not regaining her sense of taste and smell from the last time she had covid. Review of Systems     Review of Systems   Constitutional: Positive for appetite change and fatigue. Negative for activity change, chills and fever. HENT: Positive for congestion and voice change. Negative for drooling, ear discharge, ear pain, facial swelling, rhinorrhea and sore throat. Respiratory: Positive for shortness of breath. Negative for cough, choking and chest tightness. Cardiovascular: Negative for chest pain, palpitations and leg swelling. Gastrointestinal: Positive for abdominal distention. Negative for abdominal pain, nausea and vomiting. Morbidly obese   Genitourinary: Negative for difficulty urinating, dyspareunia and dysuria. Musculoskeletal: Positive for arthralgias and myalgias. Neurological: Positive for headaches. Negative for dizziness, facial asymmetry, light-headedness and numbness.    Psychiatric/Behavioral: Negative for agitation, confusion, decreased concentration and dysphoric mood. Past Medical, Surgical, Family, and Social History     She has no past medical history on file. She has a past surgical history that includes Tonsillectomy and Cholecystectomy. Her family history is not on file. She reports that she has been smoking cigarettes. She has been smoking about 1.00 pack per day. She has never used smokeless tobacco. She reports previous alcohol use. She reports that she does not use drugs. Medications     Previous Medications    DICYCLOMINE (BENTYL) 10 MG CAPSULE    Take 1 capsule by mouth every 6 hours as needed (cramps)    IBUPROFEN (ADVIL;MOTRIN) 800 MG TABLET    Take 1 tablet by mouth every 8 hours as needed for Pain (Take with food)    LOPERAMIDE (IMODIUM) 2 MG CAPSULE    Take 1 capsule by mouth every 6 hours as needed for Diarrhea       Allergies     She is allergic to asa [aspirin]. Physical Exam     INITIAL VITALS: BP: (!) 143/80, Temp: 97.6 °F (36.4 °C), Heart Rate: 85,  , SpO2: 99 %   Physical Exam  Constitutional:       General: She is not in acute distress. Appearance: She is ill-appearing and toxic-appearing. HENT:      Nose: Congestion present. No rhinorrhea. Eyes:      Extraocular Movements: Extraocular movements intact. Pupils: Pupils are equal, round, and reactive to light. Cardiovascular:      Rate and Rhythm: Normal rate and regular rhythm. Pulses: Normal pulses. Heart sounds: Normal heart sounds. Pulmonary:      Effort: Pulmonary effort is normal.      Breath sounds: Normal breath sounds. Abdominal:      General: There is distension. Tenderness: There is no abdominal tenderness. There is no guarding. Musculoskeletal:      Right lower leg: No edema. Left lower leg: No edema. Skin:     General: Skin is warm. Neurological:      General: No focal deficit present. Mental Status: She is alert and oriented to person, place, and time.    Psychiatric:         Mood and Affect: Mood normal.         Behavior: Behavior normal.         Thought Content: Thought content normal.         Judgment: Judgment normal.         Diagnostic Results     EKG   Interpreted inconjunction with emergency department physician Nancy Kiser MD  Rhythm: normal sinus   Rate: normal  Axis: normal  Ectopy: none  Conduction: normal  ST Segments: normal  T Waves: normal  Q Waves: none  Clinical Impression: no acute changes  Comparison:  None. RADIOLOGY:  XR CHEST (2 VW)   Final Result   No acute cardiopulmonary abnormality. LABS:   Results for orders placed or performed during the hospital encounter of 07/01/22   SARS-CoV-2 NAAT (Rapid)    Specimen: Nasopharyngeal Swab   Result Value Ref Range    SARS-CoV-2, NAAT Not Detected Not Detected   Rapid Flu Swab    Specimen: Nasopharyngeal   Result Value Ref Range    Rapid Influenza A Ag Negative Negative    Rapid Influenza B Ag Negative Negative       ED BEDSIDE ULTRASOUND:  No results found. RECENT VITALS:  BP: (!) 143/80, Temp: 97.6 °F (36.4 °C),Heart Rate: 85,  , SpO2: 99 %     Procedures     none    ED Course     Nursing Notes, Past Medical Hx, Past Surgical Hx, Social Hx, Allergies, and FamilyHx were reviewed. ED Course as of 07/01/22 2229 Fri Jul 01, 2022 2147 SARS-CoV-2 NAAT (Rapid) [NA]      ED Course User Index  [NA] Rhina Epsinoza MD       The patient was giventhe following medications:  Orders Placed This Encounter   Medications    guaiFENesin-dextromethorphan (ROBITUSSIN DM) 100-10 MG/5ML syrup 5 mL    OR Linked Order Group     acetaminophen (TYLENOL) tablet 650 mg     acetaminophen (TYLENOL) suppository 650 mg       CONSULTS:  None    MEDICAL DECISION MAKING / ASSESSMENT / Dolly Natasha is a 23 y.o. female with PMHx of morbid obesity, covid who presents today with symptoms concerning for a viral URI. The patient has been afebrile and hemodynamically stable. Symptoms onset was around 06/28. She is not requiring any oxygen right now. Rapid flu and covid tests were sent, which were negative. CXR was unremarkable. She has been instructed to do symptomatic care. The patient was sent home with instructions to establish care with [facility] outpatient clinic. This patient was also evaluated by the attending physician. All care plans were discussed and agreed upon. Clinical Impression     1. Cough    2. Diarrhea, unspecified type    3. Acute upper respiratory infection        Disposition     PATIENT REFERRED TO:  [address] [place] [address] E. [id number] [place] [address]. [address] [place] [address]  [phone]  Schedule an appointment as soon as possible for a visit in 1 week  establish care at the outpatient clinic.       DISCHARGE MEDICATIONS:  New Prescriptions    No medications on file       DISPOSITION  : home     Lilliana Braun MD  Resident  07/01/22 4884

## 2022-07-02 NOTE — ED PROVIDER NOTES
ED Attending Attestation Note     Date of evaluation: 7/1/2022    This patient was seen by the resident. I have seen and examined the patient, agree with the workup, evaluation, management and diagnosis. The care plan has been discussed. My assessment reveals she did states she was sent by her boss because of congestion and COVID-like symptoms. Symptoms been for 48 hours. My exam patient morbidly obese but in no acute distress. She does sound nasally congested. No nuchal rigidity. No respiratory distress.      Nancy Kiser MD  07/01/22 2037

## 2022-07-08 ENCOUNTER — TELEPHONE (OUTPATIENT)
Dept: INTERNAL MEDICINE CLINIC | Age: 27
End: 2022-07-08